# Patient Record
Sex: FEMALE | Race: WHITE | NOT HISPANIC OR LATINO
[De-identification: names, ages, dates, MRNs, and addresses within clinical notes are randomized per-mention and may not be internally consistent; named-entity substitution may affect disease eponyms.]

---

## 2017-07-10 PROBLEM — Z00.00 ENCOUNTER FOR PREVENTIVE HEALTH EXAMINATION: Status: ACTIVE | Noted: 2017-07-10

## 2017-07-11 ENCOUNTER — APPOINTMENT (OUTPATIENT)
Dept: ORTHOPEDIC SURGERY | Facility: CLINIC | Age: 27
End: 2017-07-11

## 2017-07-11 VITALS
HEART RATE: 70 BPM | HEIGHT: 64 IN | BODY MASS INDEX: 22.2 KG/M2 | SYSTOLIC BLOOD PRESSURE: 111 MMHG | WEIGHT: 130 LBS | DIASTOLIC BLOOD PRESSURE: 72 MMHG

## 2017-07-11 DIAGNOSIS — Z78.9 OTHER SPECIFIED HEALTH STATUS: ICD-10-CM

## 2017-07-11 DIAGNOSIS — Z80.7 FAMILY HISTORY OF OTHER MALIGNANT NEOPLASMS OF LYMPHOID, HEMATOPOIETIC AND RELATED TISSUES: ICD-10-CM

## 2017-07-11 DIAGNOSIS — M25.532 PAIN IN LEFT WRIST: ICD-10-CM

## 2017-07-11 RX ORDER — OXYCODONE AND ACETAMINOPHEN 5; 325 MG/1; MG/1
5-325 TABLET ORAL
Qty: 12 | Refills: 0 | Status: ACTIVE | COMMUNITY
Start: 2017-07-09

## 2017-07-11 RX ORDER — ETONOGESTREL AND ETHINYL ESTRADIOL .12; .015 MG/D; MG/D
0.12-0.015 INSERT, EXTENDED RELEASE VAGINAL
Qty: 1 | Refills: 0 | Status: ACTIVE | COMMUNITY
Start: 2016-11-28

## 2017-07-11 RX ORDER — NAPROXEN 500 MG/1
500 TABLET ORAL
Qty: 28 | Refills: 0 | Status: ACTIVE | COMMUNITY
Start: 2017-07-09

## 2017-07-12 ENCOUNTER — TRANSCRIPTION ENCOUNTER (OUTPATIENT)
Age: 27
End: 2017-07-12

## 2017-07-12 PROBLEM — Z80.7 FAMILY HISTORY OF HODGKIN'S LYMPHOMA: Status: ACTIVE | Noted: 2017-07-11

## 2017-07-12 PROBLEM — Z78.9 RARELY CONSUMES ALCOHOL: Status: ACTIVE | Noted: 2017-07-11

## 2017-07-12 PROBLEM — Z78.9 DOES NOT USE ILLICIT DRUGS: Status: ACTIVE | Noted: 2017-07-11

## 2017-07-12 PROBLEM — Z78.9 CURRENT NON-SMOKER: Status: ACTIVE | Noted: 2017-07-11

## 2017-07-12 PROBLEM — Z78.9 EXERCISES OCCASIONALLY: Status: ACTIVE | Noted: 2017-07-11

## 2017-07-13 ENCOUNTER — RESULT REVIEW (OUTPATIENT)
Age: 27
End: 2017-07-13

## 2017-07-18 ENCOUNTER — APPOINTMENT (OUTPATIENT)
Dept: ORTHOPEDIC SURGERY | Facility: CLINIC | Age: 27
End: 2017-07-18

## 2017-08-28 ENCOUNTER — APPOINTMENT (OUTPATIENT)
Dept: ORTHOPEDIC SURGERY | Facility: CLINIC | Age: 27
End: 2017-08-28

## 2017-08-29 ENCOUNTER — APPOINTMENT (OUTPATIENT)
Dept: ORTHOPEDIC SURGERY | Facility: CLINIC | Age: 27
End: 2017-08-29
Payer: COMMERCIAL

## 2017-08-29 DIAGNOSIS — S62.025A NONDISPLACED FRACTURE OF MIDDLE THIRD OF NAVICULAR [SCAPHOID] BONE OF LEFT WRIST, INITIAL ENCOUNTER FOR CLOSED FRACTURE: ICD-10-CM

## 2017-08-29 DIAGNOSIS — S62.025D NONDISPLACED FRACTURE OF MIDDLE THIRD OF NAVICULAR [SCAPHOID] BONE OF LEFT WRIST, SUBSEQUENT ENCOUNTER FOR FRACTURE WITH ROUTINE HEALING: ICD-10-CM

## 2017-08-29 PROCEDURE — 99024 POSTOP FOLLOW-UP VISIT: CPT

## 2017-08-29 PROCEDURE — 73110 X-RAY EXAM OF WRIST: CPT | Mod: LT

## 2017-09-13 ENCOUNTER — RESULT REVIEW (OUTPATIENT)
Age: 27
End: 2017-09-13

## 2017-10-11 ENCOUNTER — RX RENEWAL (OUTPATIENT)
Age: 27
End: 2017-10-11

## 2018-09-19 ENCOUNTER — RESULT REVIEW (OUTPATIENT)
Age: 28
End: 2018-09-19

## 2018-09-24 ENCOUNTER — TRANSCRIPTION ENCOUNTER (OUTPATIENT)
Age: 28
End: 2018-09-24

## 2019-02-02 ENCOUNTER — TRANSCRIPTION ENCOUNTER (OUTPATIENT)
Age: 29
End: 2019-02-02

## 2019-11-20 ENCOUNTER — RESULT REVIEW (OUTPATIENT)
Age: 29
End: 2019-11-20

## 2019-12-12 ENCOUNTER — RESULT REVIEW (OUTPATIENT)
Age: 29
End: 2019-12-12

## 2020-09-18 ENCOUNTER — APPOINTMENT (OUTPATIENT)
Dept: ANTEPARTUM | Facility: CLINIC | Age: 30
End: 2020-09-18
Payer: COMMERCIAL

## 2020-09-18 ENCOUNTER — ASOB RESULT (OUTPATIENT)
Age: 30
End: 2020-09-18

## 2020-09-18 PROCEDURE — 36416 COLLJ CAPILLARY BLOOD SPEC: CPT

## 2020-09-18 PROCEDURE — 76801 OB US < 14 WKS SINGLE FETUS: CPT

## 2020-09-18 PROCEDURE — 76813 OB US NUCHAL MEAS 1 GEST: CPT

## 2020-11-13 ENCOUNTER — ASOB RESULT (OUTPATIENT)
Age: 30
End: 2020-11-13

## 2020-11-13 ENCOUNTER — APPOINTMENT (OUTPATIENT)
Dept: ANTEPARTUM | Facility: CLINIC | Age: 30
End: 2020-11-13
Payer: COMMERCIAL

## 2020-11-13 PROCEDURE — 76805 OB US >/= 14 WKS SNGL FETUS: CPT

## 2021-04-06 ENCOUNTER — NON-APPOINTMENT (OUTPATIENT)
Age: 31
End: 2021-04-06

## 2021-04-07 ENCOUNTER — TRANSCRIPTION ENCOUNTER (OUTPATIENT)
Age: 31
End: 2021-04-07

## 2021-04-07 ENCOUNTER — INPATIENT (INPATIENT)
Facility: HOSPITAL | Age: 31
LOS: 1 days | Discharge: HOME | End: 2021-04-09
Attending: STUDENT IN AN ORGANIZED HEALTH CARE EDUCATION/TRAINING PROGRAM | Admitting: STUDENT IN AN ORGANIZED HEALTH CARE EDUCATION/TRAINING PROGRAM

## 2021-04-07 VITALS
RESPIRATION RATE: 18 BRPM | DIASTOLIC BLOOD PRESSURE: 69 MMHG | HEART RATE: 95 BPM | SYSTOLIC BLOOD PRESSURE: 114 MMHG | TEMPERATURE: 98 F

## 2021-04-07 DIAGNOSIS — Z01.818 ENCOUNTER FOR OTHER PREPROCEDURAL EXAMINATION: ICD-10-CM

## 2021-04-07 DIAGNOSIS — O26.899 OTHER SPECIFIED PREGNANCY RELATED CONDITIONS, UNSPECIFIED TRIMESTER: ICD-10-CM

## 2021-04-07 DIAGNOSIS — Z87.81 PERSONAL HISTORY OF (HEALED) TRAUMATIC FRACTURE: Chronic | ICD-10-CM

## 2021-04-07 DIAGNOSIS — Z3A.00 WEEKS OF GESTATION OF PREGNANCY NOT SPECIFIED: ICD-10-CM

## 2021-04-07 LAB
BASOPHILS # BLD AUTO: 0.04 K/UL — SIGNIFICANT CHANGE UP (ref 0–0.2)
BASOPHILS NFR BLD AUTO: 0.3 % — SIGNIFICANT CHANGE UP (ref 0–2)
BLD GP AB SCN SERPL QL: NEGATIVE — SIGNIFICANT CHANGE UP
CK SERPL-CCNC: 326 U/L — HIGH (ref 25–170)
COVID-19 SPIKE DOMAIN AB INTERP: NEGATIVE — SIGNIFICANT CHANGE UP
COVID-19 SPIKE DOMAIN ANTIBODY RESULT: 0.4 U/ML — SIGNIFICANT CHANGE UP
EOSINOPHIL # BLD AUTO: 0.32 K/UL — SIGNIFICANT CHANGE UP (ref 0–0.5)
EOSINOPHIL NFR BLD AUTO: 2.3 % — SIGNIFICANT CHANGE UP (ref 0–6)
HCT VFR BLD CALC: 36.8 % — SIGNIFICANT CHANGE UP (ref 34.5–45)
HCT VFR BLD CALC: 39.5 % — SIGNIFICANT CHANGE UP (ref 34.5–45)
HGB BLD-MCNC: 11.8 G/DL — SIGNIFICANT CHANGE UP (ref 11.5–15.5)
HGB BLD-MCNC: 12.5 G/DL — SIGNIFICANT CHANGE UP (ref 11.5–15.5)
IANC: 11.36 K/UL — HIGH (ref 1.5–8.5)
IMM GRANULOCYTES NFR BLD AUTO: 0.4 % — SIGNIFICANT CHANGE UP (ref 0–1.5)
LACTATE SERPL-SCNC: 2.4 MMOL/L — HIGH (ref 0.5–2)
LYMPHOCYTES # BLD AUTO: 1.43 K/UL — SIGNIFICANT CHANGE UP (ref 1–3.3)
LYMPHOCYTES # BLD AUTO: 10.3 % — LOW (ref 13–44)
MCHC RBC-ENTMCNC: 27.9 PG — SIGNIFICANT CHANGE UP (ref 27–34)
MCHC RBC-ENTMCNC: 28.4 PG — SIGNIFICANT CHANGE UP (ref 27–34)
MCHC RBC-ENTMCNC: 31.6 GM/DL — LOW (ref 32–36)
MCHC RBC-ENTMCNC: 32.1 GM/DL — SIGNIFICANT CHANGE UP (ref 32–36)
MCV RBC AUTO: 88.2 FL — SIGNIFICANT CHANGE UP (ref 80–100)
MCV RBC AUTO: 88.7 FL — SIGNIFICANT CHANGE UP (ref 80–100)
MONOCYTES # BLD AUTO: 0.74 K/UL — SIGNIFICANT CHANGE UP (ref 0–0.9)
MONOCYTES NFR BLD AUTO: 5.3 % — SIGNIFICANT CHANGE UP (ref 2–14)
NEUTROPHILS # BLD AUTO: 11.36 K/UL — HIGH (ref 1.8–7.4)
NEUTROPHILS NFR BLD AUTO: 81.4 % — HIGH (ref 43–77)
NRBC # BLD: 0 /100 WBCS — SIGNIFICANT CHANGE UP
NRBC # BLD: 0 /100 WBCS — SIGNIFICANT CHANGE UP
NRBC # FLD: 0 K/UL — SIGNIFICANT CHANGE UP
NRBC # FLD: 0 K/UL — SIGNIFICANT CHANGE UP
PLATELET # BLD AUTO: 225 K/UL — SIGNIFICANT CHANGE UP (ref 150–400)
PLATELET # BLD AUTO: 235 K/UL — SIGNIFICANT CHANGE UP (ref 150–400)
RBC # BLD: 4.15 M/UL — SIGNIFICANT CHANGE UP (ref 3.8–5.2)
RBC # BLD: 4.48 M/UL — SIGNIFICANT CHANGE UP (ref 3.8–5.2)
RBC # FLD: 12.9 % — SIGNIFICANT CHANGE UP (ref 10.3–14.5)
RBC # FLD: 13.1 % — SIGNIFICANT CHANGE UP (ref 10.3–14.5)
RH IG SCN BLD-IMP: POSITIVE — SIGNIFICANT CHANGE UP
RH IG SCN BLD-IMP: POSITIVE — SIGNIFICANT CHANGE UP
SARS-COV-2 IGG+IGM SERPL QL IA: 0.4 U/ML — SIGNIFICANT CHANGE UP
SARS-COV-2 IGG+IGM SERPL QL IA: NEGATIVE — SIGNIFICANT CHANGE UP
SARS-COV-2 RNA SPEC QL NAA+PROBE: SIGNIFICANT CHANGE UP
T PALLIDUM AB TITR SER: NEGATIVE — SIGNIFICANT CHANGE UP
WBC # BLD: 13.94 K/UL — HIGH (ref 3.8–10.5)
WBC # BLD: 17.77 K/UL — HIGH (ref 3.8–10.5)
WBC # FLD AUTO: 13.94 K/UL — HIGH (ref 3.8–10.5)
WBC # FLD AUTO: 17.77 K/UL — HIGH (ref 3.8–10.5)

## 2021-04-07 RX ORDER — MAGNESIUM HYDROXIDE 400 MG/1
30 TABLET, CHEWABLE ORAL
Refills: 0 | Status: DISCONTINUED | OUTPATIENT
Start: 2021-04-07 | End: 2021-04-09

## 2021-04-07 RX ORDER — AMPICILLIN TRIHYDRATE 250 MG
2 CAPSULE ORAL ONCE
Refills: 0 | Status: COMPLETED | OUTPATIENT
Start: 2021-04-07 | End: 2021-04-07

## 2021-04-07 RX ORDER — AMPICILLIN TRIHYDRATE 250 MG
1 CAPSULE ORAL EVERY 4 HOURS
Refills: 0 | Status: DISCONTINUED | OUTPATIENT
Start: 2021-04-07 | End: 2021-04-07

## 2021-04-07 RX ORDER — OXYTOCIN 10 UNIT/ML
2 VIAL (ML) INJECTION
Qty: 30 | Refills: 0 | Status: DISCONTINUED | OUTPATIENT
Start: 2021-04-07 | End: 2021-04-07

## 2021-04-07 RX ORDER — KETOROLAC TROMETHAMINE 30 MG/ML
30 SYRINGE (ML) INJECTION ONCE
Refills: 0 | Status: DISCONTINUED | OUTPATIENT
Start: 2021-04-07 | End: 2021-04-07

## 2021-04-07 RX ORDER — DOCUSATE SODIUM 100 MG
1 CAPSULE ORAL
Qty: 42 | Refills: 0
Start: 2021-04-07 | End: 2021-04-20

## 2021-04-07 RX ORDER — OXYTOCIN 10 UNIT/ML
10 VIAL (ML) INJECTION ONCE
Refills: 0 | Status: COMPLETED | OUTPATIENT
Start: 2021-04-07 | End: 2021-04-07

## 2021-04-07 RX ORDER — AER TRAVELER 0.5 G/1
1 SOLUTION RECTAL; TOPICAL EVERY 4 HOURS
Refills: 0 | Status: DISCONTINUED | OUTPATIENT
Start: 2021-04-07 | End: 2021-04-09

## 2021-04-07 RX ORDER — ACETAMINOPHEN 500 MG
975 TABLET ORAL
Refills: 0 | Status: DISCONTINUED | OUTPATIENT
Start: 2021-04-07 | End: 2021-04-09

## 2021-04-07 RX ORDER — IBUPROFEN 200 MG
600 TABLET ORAL EVERY 6 HOURS
Refills: 0 | Status: COMPLETED | OUTPATIENT
Start: 2021-04-07 | End: 2022-03-06

## 2021-04-07 RX ORDER — IBUPROFEN 200 MG
600 TABLET ORAL EVERY 6 HOURS
Refills: 0 | Status: DISCONTINUED | OUTPATIENT
Start: 2021-04-07 | End: 2021-04-09

## 2021-04-07 RX ORDER — TETANUS TOXOID, REDUCED DIPHTHERIA TOXOID AND ACELLULAR PERTUSSIS VACCINE, ADSORBED 5; 2.5; 8; 8; 2.5 [IU]/.5ML; [IU]/.5ML; UG/.5ML; UG/.5ML; UG/.5ML
0.5 SUSPENSION INTRAMUSCULAR ONCE
Refills: 0 | Status: DISCONTINUED | OUTPATIENT
Start: 2021-04-07 | End: 2021-04-09

## 2021-04-07 RX ORDER — OXYTOCIN 10 UNIT/ML
333.33 VIAL (ML) INJECTION
Qty: 20 | Refills: 0 | Status: DISCONTINUED | OUTPATIENT
Start: 2021-04-07 | End: 2021-04-07

## 2021-04-07 RX ORDER — SODIUM CHLORIDE 9 MG/ML
3 INJECTION INTRAMUSCULAR; INTRAVENOUS; SUBCUTANEOUS EVERY 8 HOURS
Refills: 0 | Status: DISCONTINUED | OUTPATIENT
Start: 2021-04-07 | End: 2021-04-09

## 2021-04-07 RX ORDER — DIPHENHYDRAMINE HCL 50 MG
25 CAPSULE ORAL EVERY 6 HOURS
Refills: 0 | Status: DISCONTINUED | OUTPATIENT
Start: 2021-04-07 | End: 2021-04-09

## 2021-04-07 RX ORDER — DIBUCAINE 1 %
1 OINTMENT (GRAM) RECTAL EVERY 6 HOURS
Refills: 0 | Status: DISCONTINUED | OUTPATIENT
Start: 2021-04-07 | End: 2021-04-09

## 2021-04-07 RX ORDER — HYDROCORTISONE 1 %
1 OINTMENT (GRAM) TOPICAL EVERY 6 HOURS
Refills: 0 | Status: DISCONTINUED | OUTPATIENT
Start: 2021-04-07 | End: 2021-04-09

## 2021-04-07 RX ORDER — PRAMOXINE HYDROCHLORIDE 150 MG/15G
1 AEROSOL, FOAM RECTAL EVERY 4 HOURS
Refills: 0 | Status: DISCONTINUED | OUTPATIENT
Start: 2021-04-07 | End: 2021-04-09

## 2021-04-07 RX ORDER — ACETAMINOPHEN 500 MG
2 TABLET ORAL
Qty: 112 | Refills: 0
Start: 2021-04-07 | End: 2021-04-20

## 2021-04-07 RX ORDER — FERROUS SULFATE 325(65) MG
1 TABLET ORAL
Qty: 30 | Refills: 3
Start: 2021-04-07 | End: 2021-08-04

## 2021-04-07 RX ORDER — OXYCODONE HYDROCHLORIDE 5 MG/1
5 TABLET ORAL
Refills: 0 | Status: DISCONTINUED | OUTPATIENT
Start: 2021-04-07 | End: 2021-04-09

## 2021-04-07 RX ORDER — ONDANSETRON 8 MG/1
4 TABLET, FILM COATED ORAL ONCE
Refills: 0 | Status: COMPLETED | OUTPATIENT
Start: 2021-04-07 | End: 2021-04-07

## 2021-04-07 RX ORDER — LANOLIN
1 OINTMENT (GRAM) TOPICAL EVERY 6 HOURS
Refills: 0 | Status: DISCONTINUED | OUTPATIENT
Start: 2021-04-07 | End: 2021-04-09

## 2021-04-07 RX ORDER — SODIUM CHLORIDE 9 MG/ML
1000 INJECTION, SOLUTION INTRAVENOUS ONCE
Refills: 0 | Status: COMPLETED | OUTPATIENT
Start: 2021-04-07 | End: 2021-04-07

## 2021-04-07 RX ORDER — OXYCODONE HYDROCHLORIDE 5 MG/1
5 TABLET ORAL ONCE
Refills: 0 | Status: DISCONTINUED | OUTPATIENT
Start: 2021-04-07 | End: 2021-04-09

## 2021-04-07 RX ORDER — IBUPROFEN 200 MG
1 TABLET ORAL
Qty: 42 | Refills: 0
Start: 2021-04-07 | End: 2021-04-20

## 2021-04-07 RX ORDER — SIMETHICONE 80 MG/1
1 TABLET, CHEWABLE ORAL
Qty: 42 | Refills: 0
Start: 2021-04-07 | End: 2021-04-20

## 2021-04-07 RX ORDER — BENZOCAINE 10 %
1 GEL (GRAM) MUCOUS MEMBRANE EVERY 6 HOURS
Refills: 0 | Status: DISCONTINUED | OUTPATIENT
Start: 2021-04-07 | End: 2021-04-09

## 2021-04-07 RX ORDER — SODIUM CHLORIDE 9 MG/ML
1000 INJECTION, SOLUTION INTRAVENOUS
Refills: 0 | Status: DISCONTINUED | OUTPATIENT
Start: 2021-04-07 | End: 2021-04-07

## 2021-04-07 RX ORDER — SIMETHICONE 80 MG/1
80 TABLET, CHEWABLE ORAL EVERY 4 HOURS
Refills: 0 | Status: DISCONTINUED | OUTPATIENT
Start: 2021-04-07 | End: 2021-04-09

## 2021-04-07 RX ADMIN — SODIUM CHLORIDE 3 MILLILITER(S): 9 INJECTION INTRAMUSCULAR; INTRAVENOUS; SUBCUTANEOUS at 20:30

## 2021-04-07 RX ADMIN — SODIUM CHLORIDE 125 MILLILITER(S): 9 INJECTION, SOLUTION INTRAVENOUS at 06:07

## 2021-04-07 RX ADMIN — Medication 600 MILLIGRAM(S): at 23:00

## 2021-04-07 RX ADMIN — Medication 108 GRAM(S): at 10:10

## 2021-04-07 RX ADMIN — Medication 0.2 MILLIGRAM(S): at 14:49

## 2021-04-07 RX ADMIN — Medication 1000 MILLIUNIT(S)/MIN: at 15:19

## 2021-04-07 RX ADMIN — Medication 975 MILLIGRAM(S): at 18:11

## 2021-04-07 RX ADMIN — Medication 216 GRAM(S): at 06:07

## 2021-04-07 RX ADMIN — Medication 600 MILLIGRAM(S): at 22:16

## 2021-04-07 RX ADMIN — Medication 30 MILLIGRAM(S): at 16:54

## 2021-04-07 RX ADMIN — Medication 0.25 MILLIGRAM(S): at 06:54

## 2021-04-07 RX ADMIN — SODIUM CHLORIDE 2000 MILLILITER(S): 9 INJECTION, SOLUTION INTRAVENOUS at 06:07

## 2021-04-07 RX ADMIN — ONDANSETRON 4 MILLIGRAM(S): 8 TABLET, FILM COATED ORAL at 16:15

## 2021-04-07 RX ADMIN — Medication 10 UNIT(S): at 14:45

## 2021-04-07 RX ADMIN — SODIUM CHLORIDE 3 MILLILITER(S): 9 INJECTION INTRAMUSCULAR; INTRAVENOUS; SUBCUTANEOUS at 22:16

## 2021-04-07 NOTE — CHART NOTE - NSCHARTNOTEFT_GEN_A_CORE
PA note    patient seen & examined for rectal pressure  VS  T(C): 37.3 (04-07-21 @ 10:17)  HR: 85 (04-07-21 @ 11:23)  BP: 101/55 (04-07-21 @ 11:20)  RR: 15 (04-07-21 @ 06:08)  SpO2: 100% (04-07-21 @ 11:24)    140/mod rich/+accels/no decels  [place] q 3-4min  5/80/-1    cont efm/toco  to start pitocin for augmentation  dw Dr Jeevan morley
PA Note    seen & examined for rectal pressure & a 6 min deceleration with improvement to baseline as per RN with repositioning    VS  T(C): 36.4 (04-07-21 @ 08:03)  HR: 97 (04-07-21 @ 10:19)  BP: 91/52 (04-07-21 @ 10:19)  RR: 15 (04-07-21 @ 06:08)  SpO2: 95% (04-07-21 @ 10:19)    /mod rich/+accels/6 min spontaneous deceleration noted  [place] q 3-4min  5/80/-1    cont efm/toco  fetal tracing responsive to resuscitative measures  will cont to monitor & consider pitocin for augmentation if tracing remains category I  will lianna Chew

## 2021-04-07 NOTE — OB PROVIDER TRIAGE NOTE - NSHPPHYSICALEXAM_GEN_ALL_CORE
LS clear bilaterally  abd soft gravid NT  TAS: vertex  FHT: baseline 135, + accels, + variables moderate variability  SVE: 3.5/60/-3  Vital Signs Last 24 Hrs  T(C): 36.9 (07 Apr 2021 04:50), Max: 36.9 (07 Apr 2021 04:50)  T(F): 98.4 (07 Apr 2021 04:50), Max: 98.4 (07 Apr 2021 04:50)  HR: 95 (07 Apr 2021 04:50) (95 - 95)  BP: 120/79 (07 Apr 2021 05:29) (114/69 - 120/79)  BP(mean): --  RR: 18 (07 Apr 2021 04:50) (18 - 18)  SpO2: --

## 2021-04-07 NOTE — OB PROVIDER TRIAGE NOTE - NSOBPROVIDERNOTE_OBGYN_ALL_OB_FT
31 yo white female  @ 40.3 wks, c/o contractions since 1130pm every 5 min. denies vb or lof. reports +GFM. AP course complicated by +GBS. denies fever chills ha n/v new swelling vision changes cp sob cough. scheduled for IOL this .    GBS: positive  meds: PNV claritin  All: denies  PMH: denies  PSH: broke nose repair   gyn hx: abn pap colposcopy normal  ob hx : denies    d/w Dr Henriquez admit for labor at 40.3 wks  Ampicillin for +GBS  Epidural for pain control  covid swab sent  prenatals reviewed

## 2021-04-07 NOTE — OB PROVIDER LABOR PROGRESS NOTE - NS_SUBJECTIVE/OBJECTIVE_OBGYN_ALL_OB_FT
Patient seen bedside for increasing urge to push as well as deep variables on FHT. Comfortable with epidural but feeling increasing rectal pressure.
Called to bedside due to prolonged late deceleration. Patient recently got epidural and SROM@645am, clear fluid. BPs at baseline. Patient repositioned on both right and left lateral and given IVF Bolus; IUPC placed for cx monitoring. Patient having tetanic contraction during this event and given terbx1. Patient repositioned on all fours with return to baseline.

## 2021-04-07 NOTE — OB NEONATOLOGY/PEDIATRICIAN DELIVERY SUMMARY - NSPEDSNEONOTESA_OBGYN_ALL_OB_FT
Baby is a 40.3 wk GA F born to a 31 y/o  mother via . Peds called for cat II tracing. Maternal history uncomplicated. Prenatal history uncomplicated. Maternal BT A+. PNL neg, NR, and immune. GBS pos on . SROM at 06:50 on , clear fluids. Baby born vigorous and crying spontaneously. WDSS. Apgars 8/9. EOS .11. Mom plans to breast and bottle feed, would like hepB. COVID status negative.    Physical Exam (Post-Delivery)  Gen: NAD; well-appearing  HEENT: NC/AT; anterior fontanelle open and flat; ears and nose clinically patent, normally set; no tags, no cleft palate appreciated  Skin: pink, warm, well-perfused, no rash  Resp: non-labored breathing  Abd: soft, NT/ND; no masses appreciated, umbilical cord with 3 vessels  Extremities: moving all extremities, no crepitus; hips negative O/B  MSK: no clavicular fracture appreciated  : Edgardo I; no abnormalities; anus patent  Back: no sacral dimple  Neuro: +sofi, +babinski, grasp, good tone throughout

## 2021-04-07 NOTE — OB RN DELIVERY SUMMARY - NS_SEPSISRSKCALC_OBGYN_ALL_OB_FT
EOS calculated successfully. EOS Risk Factor: 0.5/1000 live births ([facility] national incidence); GA=40w3d; Temp=99.14; ROM=7.8; GBS='Positive'; Antibiotics='GBS specific antibiotics > 2 hrs prior to birth'

## 2021-04-07 NOTE — DISCHARGE NOTE OB - MEDICATION SUMMARY - MEDICATIONS TO TAKE
I will START or STAY ON the medications listed below when I get home from the hospital:     mg oral tablet  -- 1 tab(s) by mouth 3 times a day, As Needed -for moderate pain   -- Do not take this drug if you are pregnant.  It is very important that you take or use this exactly as directed.  Do not skip doses or discontinue unless directed by your doctor.  May cause drowsiness or dizziness.  Obtain medical advice before taking any non-prescription drugs as some may affect the action of this medication.  Take with food or milk.    -- Indication: For pain    Tylenol Extra Strength 500 mg oral tablet  -- 2 tab(s) by mouth every 6 hours, As Needed -for mild pain   -- This product contains acetaminophen.  Do not use  with any other product containing acetaminophen to prevent possible liver damage.    -- Indication: For pain    ferrous sulfate 324 mg (65 mg elemental iron) oral delayed release tablet  -- 1 tab(s) by mouth once a day   -- Check with your doctor before becoming pregnant.  Do not chew, break, or crush.  May discolor urine or feces.    -- Indication: For iron    Colace 100 mg oral capsule  -- 1 cap(s) by mouth every 8 hours, As Needed -for constipation   -- Medication should be taken with plenty of water.    -- Indication: For constipation    simethicone 125 mg oral tablet  -- 1 tab(s) by mouth 3 times a day (after meals), As Needed gas pain/distention  -- Indication: For gas   I will START or STAY ON the medications listed below when I get home from the hospital:     mg oral tablet  -- 1 tab(s) by mouth 3 times a day, As Needed -for moderate pain   -- Do not take this drug if you are pregnant.  It is very important that you take or use this exactly as directed.  Do not skip doses or discontinue unless directed by your doctor.  May cause drowsiness or dizziness.  Obtain medical advice before taking any non-prescription drugs as some may affect the action of this medication.  Take with food or milk.    -- Indication: For pain, as needed    Tylenol Extra Strength 500 mg oral tablet  -- 2 tab(s) by mouth every 6 hours, As Needed -for mild pain   -- This product contains acetaminophen.  Do not use  with any other product containing acetaminophen to prevent possible liver damage.    -- Indication: For pain, as needed    ferrous sulfate 324 mg (65 mg elemental iron) oral delayed release tablet  -- 1 tab(s) by mouth once a day   -- Check with your doctor before becoming pregnant.  Do not chew, break, or crush.  May discolor urine or feces.    -- Indication: For iron    Colace 100 mg oral capsule  -- 1 cap(s) by mouth every 8 hours, As Needed -for constipation   -- Medication should be taken with plenty of water.    -- Indication: For constipation    simethicone 125 mg oral tablet  -- 1 tab(s) by mouth 3 times a day (after meals), As Needed gas pain/distention  -- Indication: For gas

## 2021-04-07 NOTE — DISCHARGE NOTE OB - CARE PROVIDER_API CALL
Dorota Chew (DO)  Obstetrics and Gynecology Obstetrics and Gynocology  [address] [place] [address]  Phone: [phone]  Fax: [phone]  Established Patient  Follow Up Time: Routine

## 2021-04-07 NOTE — OB PROVIDER H&P - ASSESSMENT
29 yo white female  @ 40.3 wks, c/o contractions since 1130pm every 5 min. denies vb or lof. reports +GFM. AP course complicated by +GBS. denies fever chills ha n/v new swelling vision changes cp sob cough. scheduled for IOL this .    GBS: positive  meds: PNV claritin  All: denies  PMH: denies  PSH: broke nose repair   gyn hx: abn pap colposcopy normal  ob hx : denies    d/w Dr Henriquez admit for labor at 40.3 wks  Ampicillin for +GBS  Epidural for pain control  covid swab sent  prenatals reviewed

## 2021-04-07 NOTE — OB PROVIDER LABOR PROGRESS NOTE - NS_OBIHIFHRDETAILS_OBGYN_ALL_OB_FT
140bpm, mod rich, +accel, +9-10min late decel (toney 90bpm)
Baseline 130s, moderate variability, accelerations present, occasional variable deceleration

## 2021-04-07 NOTE — PROVIDER CONTACT NOTE (OTHER) - SITUATION
oral temperature of 100.7. rectal temperature taken of 100.7 as well. patient shivering, c/o chills.

## 2021-04-07 NOTE — OB PROVIDER DELIVERY SUMMARY - NSPROVIDERDELIVERYNOTE_OBGYN_ALL_OB_FT
delivery of OA female  w/ uncomplicated delivery of anterior shoulder and remainder of body without difficulty over an intact perineum. Short cord noted. Cord clamped and cut. Spontaneous delivery of intact placenta. Lower segment atony noted, pitocin placed on pressure and methergine given IM x 1. Rectal cytotec placed. . VSS, monitor bleeding.

## 2021-04-07 NOTE — DISCHARGE NOTE OB - CARE PLAN
Principal Discharge DX:	 (normal spontaneous vaginal delivery)  Goal:	recovery  Assessment and plan of treatment:	recovery

## 2021-04-07 NOTE — OB PROVIDER LABOR PROGRESS NOTE - ASSESSMENT
30 y.o.  at 40w3d presenting in labor. Patient SROMed @ 6:45 with IUPC in place. Received terb x 1, pit started @1145.     Plan   - Anticipate   - pause pitocin     d/w Dr. Jeevan Prescott PGY-1
29yo  at 40.3w recently admitted in labor; patient now with SROM and prolonged decel requiring terbx1 with improvement in FHT.   -Will continue to resuscitate. IVF bolus running.   -SROM@645a with clear fluid  -IUPC in place  -Epi in place; BPs at baseline. pt comfortable    Patient seen and examined with Zay PGY3  D/w Dr. Henriquez who will inform oncoming attending Dr. Tetelman RFrankel PGY2

## 2021-04-07 NOTE — DISCHARGE NOTE OB - PATIENT PORTAL LINK FT
You can access the FollowMyHealth Patient Portal offered by [facility] by registering at the following website: [email or web address]. By joining [facility] FollowMyHealth portal, you will also be able to view your health information using other applications (apps) compatible with our system.

## 2021-04-07 NOTE — OB PROVIDER TRIAGE NOTE - HISTORY OF PRESENT ILLNESS
31 yo white female  @ 40.3 wks, c/o contractions since 1130pm every 5 min. denies vb or lof. reports +GFM. AP course complicated by +GBS. denies fever chills ha n/v new swelling vision changes cp sob cough. scheduled for IOL this .    GBS: positive  meds: PNV claritin  All: denies  PMH: denies  PSH: broke nose repair   gyn hx: abn pap colposcopy normal  ob hx : denies

## 2021-04-07 NOTE — OB RN DELIVERY SUMMARY - NSSELHIDDEN_OBGYN_ALL_OB_FT
[NS_DeliveryAttending1_OBGYN_ALL_OB_FT:[id number]],[NS_DeliveryRN_OBGYN_ALL_OB_FT:[id number]]

## 2021-04-07 NOTE — OB PROVIDER H&P - PROBLEM SELECTOR PLAN 1
d/w Dr Henriquez admit for labor at 40.3 wks  Ampicillin for +GBS  Epidural for pain control  covid swab sent  prenatals reviewed

## 2021-04-08 ENCOUNTER — APPOINTMENT (OUTPATIENT)
Dept: DISASTER EMERGENCY | Facility: CLINIC | Age: 31
End: 2021-04-08

## 2021-04-08 RX ORDER — SENNA PLUS 8.6 MG/1
1 TABLET ORAL
Refills: 0 | Status: DISCONTINUED | OUTPATIENT
Start: 2021-04-08 | End: 2021-04-09

## 2021-04-08 RX ADMIN — Medication 975 MILLIGRAM(S): at 07:10

## 2021-04-08 RX ADMIN — Medication 975 MILLIGRAM(S): at 06:29

## 2021-04-08 RX ADMIN — SODIUM CHLORIDE 3 MILLILITER(S): 9 INJECTION INTRAMUSCULAR; INTRAVENOUS; SUBCUTANEOUS at 14:42

## 2021-04-08 RX ADMIN — Medication 975 MILLIGRAM(S): at 11:53

## 2021-04-08 RX ADMIN — Medication 975 MILLIGRAM(S): at 12:30

## 2021-04-08 RX ADMIN — Medication 975 MILLIGRAM(S): at 22:46

## 2021-04-08 RX ADMIN — Medication 600 MILLIGRAM(S): at 08:38

## 2021-04-08 RX ADMIN — Medication 975 MILLIGRAM(S): at 17:58

## 2021-04-08 RX ADMIN — Medication 600 MILLIGRAM(S): at 15:42

## 2021-04-08 RX ADMIN — Medication 600 MILLIGRAM(S): at 09:11

## 2021-04-08 RX ADMIN — Medication 975 MILLIGRAM(S): at 21:27

## 2021-04-08 RX ADMIN — Medication 1 TABLET(S): at 11:53

## 2021-04-08 RX ADMIN — Medication 975 MILLIGRAM(S): at 18:45

## 2021-04-08 RX ADMIN — Medication 600 MILLIGRAM(S): at 15:08

## 2021-04-08 NOTE — LACTATION INITIAL EVALUATION - LACTATION INTERVENTIONS
Primary RN made aware of consult done and plan./initiate skin to skin/initiate hand expression routine/initiate/review early breastfeeding management guidelines/initiate/review techniques for position and latch/post discharge community resources provided/initiate/review breast massage/compression

## 2021-04-09 VITALS
RESPIRATION RATE: 18 BRPM | HEART RATE: 67 BPM | TEMPERATURE: 98 F | SYSTOLIC BLOOD PRESSURE: 103 MMHG | DIASTOLIC BLOOD PRESSURE: 61 MMHG | OXYGEN SATURATION: 98 %

## 2021-04-09 RX ADMIN — Medication 600 MILLIGRAM(S): at 06:55

## 2021-04-09 RX ADMIN — Medication 600 MILLIGRAM(S): at 05:49

## 2021-04-21 ENCOUNTER — NON-APPOINTMENT (OUTPATIENT)
Age: 31
End: 2021-04-21

## 2021-07-14 NOTE — OB PROVIDER TRIAGE NOTE - NS_ATTENDINFORMED_OBGYN_ALL_OB
Dr Henriquez [place] Counseling:  I discussed with the patient the risks of imiquimod including but not limited to erythema, scaling, itching, weeping, crusting, and pain.  Patient understands that the inflammatory response to imiquimod is variable from person to person and was educated regarded proper titration schedule.  If flu-like symptoms develop, patient knows to discontinue the medication and contact us.

## 2021-12-17 ENCOUNTER — TRANSCRIPTION ENCOUNTER (OUTPATIENT)
Age: 31
End: 2021-12-17

## 2022-02-01 ENCOUNTER — TRANSCRIPTION ENCOUNTER (OUTPATIENT)
Age: 32
End: 2022-02-01

## 2022-07-01 ENCOUNTER — OFFICE (OUTPATIENT)
Dept: URBAN - METROPOLITAN AREA CLINIC 109 | Facility: CLINIC | Age: 32
Setting detail: OPHTHALMOLOGY
End: 2022-07-01
Payer: COMMERCIAL

## 2022-07-01 DIAGNOSIS — H00.11: ICD-10-CM

## 2022-07-01 DIAGNOSIS — H10.45: ICD-10-CM

## 2022-07-01 PROCEDURE — 99203 OFFICE O/P NEW LOW 30 MIN: CPT | Performed by: OPHTHALMOLOGY

## 2022-07-01 ASSESSMENT — VISUAL ACUITY
OS_BCVA: 20/20
OD_BCVA: 20/30

## 2022-07-01 ASSESSMENT — TONOMETRY
OS_IOP_MMHG: 16
OD_IOP_MMHG: 16

## 2022-07-08 ENCOUNTER — RX ONLY (RX ONLY)
Age: 32
End: 2022-07-08

## 2022-07-08 ENCOUNTER — OFFICE (OUTPATIENT)
Dept: URBAN - METROPOLITAN AREA CLINIC 109 | Facility: CLINIC | Age: 32
Setting detail: OPHTHALMOLOGY
End: 2022-07-08
Payer: COMMERCIAL

## 2022-07-08 DIAGNOSIS — H10.45: ICD-10-CM

## 2022-07-08 DIAGNOSIS — H00.11: ICD-10-CM

## 2022-07-08 PROCEDURE — 92012 INTRM OPH EXAM EST PATIENT: CPT | Performed by: OPHTHALMOLOGY

## 2022-07-08 ASSESSMENT — REFRACTION_CURRENTRX
OD_OVR_VA: 20/
OS_AXIS: 15
OS_CYLINDER: -1.25
OD_AXIS: 164
OD_CYLINDER: -0.75
OD_SPHERE: -2.50
OS_SPHERE: -1.75
OS_OVR_VA: 20/

## 2022-07-08 ASSESSMENT — CONFRONTATIONAL VISUAL FIELD TEST (CVF)
OS_FINDINGS: FULL
OD_FINDINGS: FULL

## 2022-07-08 ASSESSMENT — VISUAL ACUITY
OS_BCVA: 20/25
OD_BCVA: 20/30

## 2022-07-08 ASSESSMENT — TONOMETRY: OD_IOP_MMHG: 17

## 2022-07-22 ENCOUNTER — OFFICE (OUTPATIENT)
Dept: URBAN - METROPOLITAN AREA CLINIC 109 | Facility: CLINIC | Age: 32
Setting detail: OPHTHALMOLOGY
End: 2022-07-22
Payer: COMMERCIAL

## 2022-07-22 DIAGNOSIS — H52.13: ICD-10-CM

## 2022-07-22 PROBLEM — H00.11 CHALAZION; RIGHT UPPER LID: Status: ACTIVE | Noted: 2022-07-01

## 2022-07-22 PROCEDURE — SCREF LASIK EVAL: Performed by: OPHTHALMOLOGY

## 2022-07-22 ASSESSMENT — REFRACTION_MANIFEST
OS_CYLINDER: -0.75
OS_SPHERE: -2.00
OS_VA1: 20/20
OS_AXIS: 4
OD_SPHERE: -2.50
OD_VA1: 20/20
OD_AXIS: 165
OD_CYLINDER: -1.00

## 2022-07-22 ASSESSMENT — PACHYMETRY
OD_CT_UM: 498
OS_CT_CORRECTION: 3
OD_CT_CORRECTION: 4
OS_CT_UM: 501

## 2022-07-22 ASSESSMENT — KERATOMETRY
OD_K1POWER_DIOPTERS: 43.25
OS_AXISANGLE_DEGREES: 106
OD_AXISANGLE_DEGREES: 059
OD_K2POWER_DIOPTERS: 44.50
OS_K2POWER_DIOPTERS: 44.37
OS_K1POWER_DIOPTERS: 43.25

## 2022-07-22 ASSESSMENT — AXIALLENGTH_DERIVED
OS_AL: 24.4323
OD_AL: 24.6701
OS_AL: 24.33
OD_AL: 24.7235

## 2022-07-22 ASSESSMENT — VISUAL ACUITY
OD_BCVA: 20/30
OS_BCVA: 20/20-1

## 2022-07-22 ASSESSMENT — CONFRONTATIONAL VISUAL FIELD TEST (CVF)
OS_FINDINGS: FULL
OD_FINDINGS: FULL

## 2022-07-22 ASSESSMENT — REFRACTION_CURRENTRX
OD_CYLINDER: -0.75
OS_AXIS: 15
OS_SPHERE: -1.75
OS_OVR_VA: 20/
OD_AXIS: 164
OD_SPHERE: -2.50
OS_CYLINDER: -1.25
OD_OVR_VA: 20/

## 2022-07-22 ASSESSMENT — SPHEQUIV_DERIVED
OS_SPHEQUIV: -2.375
OD_SPHEQUIV: -3.125
OD_SPHEQUIV: -3
OS_SPHEQUIV: -2.125

## 2022-07-22 ASSESSMENT — REFRACTION_AUTOREFRACTION
OS_AXIS: 004
OD_AXIS: 158
OD_SPHERE: -2.50
OS_CYLINDER: -0.75
OS_SPHERE: -1.75
OD_CYLINDER: -1.25

## 2022-08-09 NOTE — DISCHARGE NOTE OB - CALL YOUR HEALTHCARE PROVIDER IF YOU ARE EXPERIENCING SYMPTOMS OF DEPRESSION THAT LAST MORE THAN THREE DAYS
Is This A New Presentation, Or A Follow-Up?: Rash Additional History: Pt took a pack prescribed by urgent care Dr Statement Selected

## 2022-09-12 NOTE — OB RN PATIENT PROFILE - PRO PRENATAL LABS ORI SOURCE ANTIBODY SCREEN
Patient moms needs  Refill for 90 day  For insurace to cover with additional refills '  Also please call patient or mom regarding medication,    hard copy, drawn during this pregnancy

## 2023-01-31 ENCOUNTER — NON-APPOINTMENT (OUTPATIENT)
Age: 33
End: 2023-01-31

## 2023-03-16 ENCOUNTER — OFFICE (OUTPATIENT)
Dept: URBAN - METROPOLITAN AREA CLINIC 109 | Facility: CLINIC | Age: 33
Setting detail: OPHTHALMOLOGY
End: 2023-03-16
Payer: COMMERCIAL

## 2023-03-16 DIAGNOSIS — H01.005: ICD-10-CM

## 2023-03-16 DIAGNOSIS — H01.002: ICD-10-CM

## 2023-03-16 DIAGNOSIS — H00.11: ICD-10-CM

## 2023-03-16 DIAGNOSIS — H01.004: ICD-10-CM

## 2023-03-16 DIAGNOSIS — H01.001: ICD-10-CM

## 2023-03-16 PROCEDURE — 99213 OFFICE O/P EST LOW 20 MIN: CPT | Performed by: OPHTHALMOLOGY

## 2023-03-16 PROCEDURE — 92285 EXTERNAL OCULAR PHOTOGRAPHY: CPT | Performed by: OPHTHALMOLOGY

## 2023-03-16 ASSESSMENT — REFRACTION_AUTOREFRACTION
OS_AXIS: 004
OS_CYLINDER: -0.75
OD_SPHERE: -2.50
OS_SPHERE: -1.75
OD_CYLINDER: -1.25
OD_AXIS: 158

## 2023-03-16 ASSESSMENT — AXIALLENGTH_DERIVED
OS_AL: 24.33
OD_AL: 24.7235

## 2023-03-16 ASSESSMENT — VISUAL ACUITY
OD_BCVA: 20/25
OS_BCVA: 20/20-2

## 2023-03-16 ASSESSMENT — KERATOMETRY
OS_AXISANGLE_DEGREES: 106
OD_K2POWER_DIOPTERS: 44.50
OD_K1POWER_DIOPTERS: 43.25
OS_K2POWER_DIOPTERS: 44.37
OS_K1POWER_DIOPTERS: 43.25
OD_AXISANGLE_DEGREES: 059

## 2023-03-16 ASSESSMENT — SPHEQUIV_DERIVED
OS_SPHEQUIV: -2.125
OD_SPHEQUIV: -3.125

## 2023-03-16 ASSESSMENT — LID EXAM ASSESSMENTS
OD_BLEPHARITIS: RLL RUL 2+
OS_BLEPHARITIS: LLL LUL 2+

## 2023-03-16 ASSESSMENT — CONFRONTATIONAL VISUAL FIELD TEST (CVF)
OD_FINDINGS: FULL
OS_FINDINGS: FULL

## 2023-03-20 ENCOUNTER — RX ONLY (RX ONLY)
Age: 33
End: 2023-03-20

## 2023-03-20 ENCOUNTER — OFFICE (OUTPATIENT)
Dept: URBAN - METROPOLITAN AREA CLINIC 6 | Facility: CLINIC | Age: 33
Setting detail: OPHTHALMOLOGY
End: 2023-03-20
Payer: COMMERCIAL

## 2023-03-20 DIAGNOSIS — H00.11: ICD-10-CM

## 2023-03-20 PROBLEM — H01.005 BLEPHARITIS; RIGHT UPPER LID, RIGHT LOWER LID, LEFT UPPER LID, LEFT LOWER LID: Status: ACTIVE | Noted: 2023-03-16

## 2023-03-20 PROBLEM — H01.001 LASIK EVALUATION TODAY; BOTH EYES: Status: ACTIVE | Noted: 2023-03-16

## 2023-03-20 PROBLEM — H01.002 LASIK EVALUATION TODAY; BOTH EYES: Status: ACTIVE | Noted: 2023-03-16

## 2023-03-20 PROBLEM — H01.004 BLEPHARITIS; RIGHT UPPER LID, RIGHT LOWER LID, LEFT UPPER LID, LEFT LOWER LID: Status: ACTIVE | Noted: 2023-03-16

## 2023-03-20 PROBLEM — H01.001 BLEPHARITIS; RIGHT UPPER LID, RIGHT LOWER LID, LEFT UPPER LID, LEFT LOWER LID: Status: ACTIVE | Noted: 2023-03-16

## 2023-03-20 PROBLEM — H01.004 LASIK EVALUATION TODAY; BOTH EYES: Status: ACTIVE | Noted: 2023-03-16

## 2023-03-20 PROBLEM — H01.005 LASIK EVALUATION TODAY; BOTH EYES: Status: ACTIVE | Noted: 2023-03-16

## 2023-03-20 PROBLEM — H01.002 BLEPHARITIS; RIGHT UPPER LID, RIGHT LOWER LID, LEFT UPPER LID, LEFT LOWER LID: Status: ACTIVE | Noted: 2023-03-16

## 2023-03-20 PROCEDURE — 67800 REMOVE EYELID LESION: CPT | Performed by: OPHTHALMOLOGY

## 2023-03-20 ASSESSMENT — SPHEQUIV_DERIVED
OD_SPHEQUIV: -3.125
OS_SPHEQUIV: -2.125

## 2023-03-20 ASSESSMENT — KERATOMETRY
OS_K1POWER_DIOPTERS: 43.25
OD_AXISANGLE_DEGREES: 059
OS_AXISANGLE_DEGREES: 106
OS_K2POWER_DIOPTERS: 44.37
OD_K2POWER_DIOPTERS: 44.50
OD_K1POWER_DIOPTERS: 43.25

## 2023-03-20 ASSESSMENT — LID EXAM ASSESSMENTS
OS_BLEPHARITIS: LLL LUL 2+
OD_BLEPHARITIS: RLL RUL 2+

## 2023-03-20 ASSESSMENT — CONFRONTATIONAL VISUAL FIELD TEST (CVF)
OD_FINDINGS: FULL
OS_FINDINGS: FULL

## 2023-03-20 ASSESSMENT — REFRACTION_AUTOREFRACTION
OS_CYLINDER: -0.75
OS_AXIS: 004
OD_SPHERE: -2.50
OD_CYLINDER: -1.25
OD_AXIS: 158
OS_SPHERE: -1.75

## 2023-03-20 ASSESSMENT — VISUAL ACUITY
OD_BCVA: 20/20-1
OS_BCVA: 20/30

## 2023-03-20 ASSESSMENT — AXIALLENGTH_DERIVED
OD_AL: 24.7235
OS_AL: 24.33

## 2023-12-13 ENCOUNTER — OFFICE (OUTPATIENT)
Dept: URBAN - METROPOLITAN AREA CLINIC 104 | Facility: CLINIC | Age: 33
Setting detail: OPHTHALMOLOGY
End: 2023-12-13
Payer: COMMERCIAL

## 2023-12-13 DIAGNOSIS — H00.11: ICD-10-CM

## 2023-12-13 PROCEDURE — 92285 EXTERNAL OCULAR PHOTOGRAPHY: CPT | Performed by: OPHTHALMOLOGY

## 2023-12-13 PROCEDURE — 67800 REMOVE EYELID LESION: CPT | Mod: E3 | Performed by: OPHTHALMOLOGY

## 2023-12-13 ASSESSMENT — CONFRONTATIONAL VISUAL FIELD TEST (CVF)
OS_FINDINGS: FULL
OD_FINDINGS: FULL

## 2023-12-13 ASSESSMENT — LID EXAM ASSESSMENTS
OS_BLEPHARITIS: LLL LUL 2+
OD_BLEPHARITIS: RLL RUL 2+

## 2023-12-13 ASSESSMENT — SPHEQUIV_DERIVED
OS_SPHEQUIV: -2.125
OD_SPHEQUIV: -3.125

## 2023-12-13 ASSESSMENT — REFRACTION_AUTOREFRACTION
OS_SPHERE: -1.75
OD_SPHERE: -2.50
OD_AXIS: 158
OS_AXIS: 004
OD_CYLINDER: -1.25
OS_CYLINDER: -0.75

## 2024-01-04 ENCOUNTER — OFFICE (OUTPATIENT)
Dept: URBAN - METROPOLITAN AREA CLINIC 109 | Facility: CLINIC | Age: 34
Setting detail: OPHTHALMOLOGY
End: 2024-01-04
Payer: COMMERCIAL

## 2024-01-04 DIAGNOSIS — H00.11: ICD-10-CM

## 2024-01-04 DIAGNOSIS — H01.001: ICD-10-CM

## 2024-01-04 DIAGNOSIS — H01.005: ICD-10-CM

## 2024-01-04 DIAGNOSIS — H01.004: ICD-10-CM

## 2024-01-04 DIAGNOSIS — H01.002: ICD-10-CM

## 2024-01-04 PROCEDURE — 99213 OFFICE O/P EST LOW 20 MIN: CPT | Performed by: OPHTHALMOLOGY

## 2024-01-04 ASSESSMENT — SPHEQUIV_DERIVED
OD_SPHEQUIV: -3.125
OS_SPHEQUIV: -2.125

## 2024-01-04 ASSESSMENT — REFRACTION_AUTOREFRACTION
OD_SPHERE: -2.50
OS_CYLINDER: -0.75
OD_CYLINDER: -1.25
OS_SPHERE: -1.75
OD_AXIS: 158
OS_AXIS: 004

## 2024-01-04 ASSESSMENT — CONFRONTATIONAL VISUAL FIELD TEST (CVF)
OD_FINDINGS: FULL
OS_FINDINGS: FULL

## 2024-01-04 ASSESSMENT — LID EXAM ASSESSMENTS
OD_BLEPHARITIS: RLL RUL 2+
OS_BLEPHARITIS: LLL LUL 2+